# Patient Record
Sex: MALE | Race: WHITE | NOT HISPANIC OR LATINO | Employment: FULL TIME | URBAN - METROPOLITAN AREA
[De-identification: names, ages, dates, MRNs, and addresses within clinical notes are randomized per-mention and may not be internally consistent; named-entity substitution may affect disease eponyms.]

---

## 2017-01-30 ENCOUNTER — GENERIC CONVERSION - ENCOUNTER (OUTPATIENT)
Dept: OTHER | Facility: OTHER | Age: 62
End: 2017-01-30

## 2017-02-20 ENCOUNTER — ALLSCRIPTS OFFICE VISIT (OUTPATIENT)
Dept: OTHER | Facility: OTHER | Age: 62
End: 2017-02-20

## 2017-08-21 ENCOUNTER — ALLSCRIPTS OFFICE VISIT (OUTPATIENT)
Dept: OTHER | Facility: OTHER | Age: 62
End: 2017-08-21

## 2017-08-21 DIAGNOSIS — Z12.11 ENCOUNTER FOR SCREENING FOR MALIGNANT NEOPLASM OF COLON: ICD-10-CM

## 2017-09-11 ENCOUNTER — GENERIC CONVERSION - ENCOUNTER (OUTPATIENT)
Dept: OTHER | Facility: OTHER | Age: 62
End: 2017-09-11

## 2017-12-04 ENCOUNTER — ALLSCRIPTS OFFICE VISIT (OUTPATIENT)
Dept: OTHER | Facility: OTHER | Age: 62
End: 2017-12-04

## 2018-01-10 NOTE — PROGRESS NOTES
Assessment    1  Encounter for preventive health examination (V70 0) (Z00 00)   2  Fatigue (780 79) (R53 83)    Plan  Health Maintenance    · We recommend that you follow the "Mediterranean diet "; Status:Complete;   Done:  [id number] 09:51AM    Check CBC, PSA, CMP, Fasting lipids  Start Effexor 75 mg PO qd, titrate as appropriate  Decrease Wellbutrin slowly  Form/documents completed and signed  F/U 2 weeks     Discussion/Summary  health maintenance visit     Stable medically  We will check fasting b/w today, FOBT given to the patient w/ instructions  We also discussed the increased stressors impacting the pt in his psychosocial situation  He has reconciled w/ his sister and acquired a couple of new cats for home  His work situation might improve w/ the hiring of another  but thept is careful not to gets his hopes up too far  He has not picked up alcohol nor drugs and is in contact w/ his sponsor  I encourage him to share more at the meetings but only to his comfort level  We will adjust his medications w/ a decrease in the Wellbutrin and introducing Effexor  Follow in ~2 weeks  The patient was counseled regarding instructions for management, impressions  total time of encounter was 30 minutes and 20 minutes was spent counseling  History of Present Illness  HM, Adult Male: The patient is being seen for a health maintenance evaluation  The last health maintenance visit was 6 month(s) ago  General Health: The patient's health since the last visit is described as fair  He denies vision problems  He denies hearing loss  Lifestyle:  He has weight concerns  He does not exercise regularly  He denies alcohol use  He denies drug use  Screening: Prostate cancer screening includes prostate-specific antigen testing last year  Testicular cancer screening includes no previous evaluation  Colorectal cancer screening includes last colonoscopy done `10 yrs  HPI: 64 y/o WM for employment biometric PE   No acute c/o  He does note, however, that his mood has decreased and he is more irritable than at last visit He reports having had a rough start of the year w/ the death of his cats at the hands of his sister who did not take care of them as promised when the pt went on a trip This and work stressors have made it difficult not to '' and break his sobriety  He has reached out to his sponsor and continues to attend meetings  Review of Systems    Constitutional: No fever or chills, feels well, no tiredness, no recent weight gain or weight loss  Eyes: No complaints of eye pain, no red eyes, no discharge from eyes, no itchy eyes  ENT: no complaints of earache, no hearing loss, no nosebleeds, no nasal discharge, no sore throat, no hoarseness  Cardiovascular: Has not followed w/ Cardiology, but No complaints of slow heart rate, no fast heart rate, no chest pain, no palpitations, no leg claudication, no lower extremity and as noted in HPI  Respiratory: No complaints of shortness of breath, no wheezing, no cough, no SOB on exertion, no orthopnea or PND  Gastrointestinal: No complaints of abdominal pain, no constipation, no nausea or vomiting, no diarrhea or bloody stools  Musculoskeletal: No complaints of arthralgia, no myalgias, no joint swelling or stiffness, no limb pain or swelling  Integumentary: No complaints of skin rash or skin lesions, no itching, no skin wound, no dry skin  Neurological: No compliants of headache, no confusion, no convulsions, no numbness or tingling, no dizziness or fainting, no limb weakness, no difficulty walking  Psychiatric: as noted in HPI  Endocrine: No complaints of proptosis, no hot flashes, no muscle weakness, no erectile dysfunction, no deepening of the voice, no feelings of weakness  Hematologic/Lymphatic: No complaints of swollen glands, no swollen glands in the neck, does not bleed easily, no easy bruising  ROS reviewed  Active Problems    1  Atherosclerosis of other coronary artery bypass graft (414 04) (I25 810)   2  Generalized anxiety disorder (300 02) (F41 1)   3  Hypertension, benign (401 1) (I10)   4  Mixed hyperlipidemia (272 2) (E78 2)    Past Medical History    · History of Obesity (BMI 30 0-34 9) (278 00) (E66 9)   · History of Recovering alcoholic (988 50) (X78 67)    Surgical History    · History of Appendectomy   · History of CABG   · History of Cath Stent Placement   · History of Cholecystotomy   · History of Inguinal Hernia Repair   · History of Tonsillectomy    Family History  Mother    · Family history of COPD, severe  Father    · Family history of CAD (coronary artery disease)   · Family history of COPD, severe  Maternal Aunt    · Family history of diabetes mellitus (V18 0) (Z83 3)  Maternal Uncle    · Family history of diabetes mellitus (V18 0) (Z83 3)    Social History    · Denies alcohol consumption (V49 89) (Z78 9)   · Former smoker (V15 82) (E97 275)   · Full-time employment   · History of tattoo (709 09) (L81 8)   · Lives alone with help available (V60 3) (Z60 2)    Current Meds   1  BuPROPion HCl ER (XL) 300 MG Oral Tablet Extended Release 24 Hour; Therapy: (Recorded:30Jan2017) to Recorded   2  Metoprolol Tartrate 25 MG Oral Tablet; Therapy: (Recorded:30Jan2017) to Recorded   3  Nitroglycerin 0 4 MG Sublingual Tablet Sublingual;   Therapy: (Recorded:30Jan2017) to Recorded   4  Simvastatin 20 MG Oral Tablet; Therapy: (Recorded:30Jan2017) to Recorded    Allergies    1  No Known Drug Allergies    Vitals   Recorded: [id number] 58:57KV   Systolic 063, RUE, Sitting   Diastolic 62, RUE, Sitting   Height 5 ft 11 in   Weight 261 lb    BMI Calculated 36 4   BSA Calculated 2 36     Physical Exam    Constitutional   General appearance: No acute distress, well appearing and well nourished  Eyes   Pupils and irises: Equal, round and reactive to light      Pulmonary   Respiratory effort: No increased work of breathing or signs of respiratory distress  Auscultation of lungs: Clear to auscultation  Cardiovascular   Auscultation of heart: Normal rate and rhythm, normal S1 and S2, without murmurs  Examination of extremities for edema and/or varicosities: Normal     Abdomen   Abdomen: Abnormal   The abdomen was obese  Lymphatic   Palpation of lymph nodes in neck: No lymphadenopathy  Musculoskeletal   Gait and station: Normal     Digits and nails: Normal without clubbing or cyanosis  Inspection/palpation of joints, bones, and muscles: Normal     Skin   Skin and subcutaneous tissue: Normal without rashes or lesions  Neurologic   Cranial nerves: Cranial nerves 2-12 intact  Sensation: No sensory loss  Psychiatric   Orientation to person, place and time: Normal     Mood and affect: Normal   Mood and Affect: appropriate mood, appropriate affect, not angry and not indifferent  Results/Data  PHQ-9 Adult Depression Screening [id number] 08:43AM User, [facility]     Test Name Result Flag Reference   PHQ-9 Adult Depression Score 14     Over the last two weeks, how often have you been bothered by any of the following problems? Little interest or pleasure in doing things: More than half the days - 2  Feeling down, depressed, or hopeless: More than half the days - 2  Trouble falling or staying asleep, or sleeping too much: More than half the days - 2  Feeling tired or having little energy: Several days - 1  Poor appetite or over eating: More than half the days - 2  Feeling bad about yourself - or that you are a failure or have let yourself or your family down: Nearly every day - 3  Trouble concentrating on things, such as reading the newspaper or watching television: Several days - 1  Moving or speaking so slowly that other people could have noticed   Or the opposite -  being so fidgety or restless that you have been moving around a lot more than usual: Several days - 1  Thoughts that you would be better off dead, or of hurting yourself in some way: Not at all - 0   PHQ-9 Adult Depression Screening Positive     PHQ-9 Difficulty Level Very difficult     PHQ-9 Severity Moderate Depression         Signatures   Electronically signed by : KASSIDY Mckeon ; Aug 21 2017  9:58AM EST                       (Author)

## 2018-01-13 VITALS
WEIGHT: 248 LBS | BODY MASS INDEX: 34.72 KG/M2 | SYSTOLIC BLOOD PRESSURE: 120 MMHG | DIASTOLIC BLOOD PRESSURE: 62 MMHG | HEIGHT: 71 IN

## 2018-01-13 VITALS
BODY MASS INDEX: 36.54 KG/M2 | HEIGHT: 71 IN | DIASTOLIC BLOOD PRESSURE: 62 MMHG | WEIGHT: 261 LBS | SYSTOLIC BLOOD PRESSURE: 100 MMHG

## 2018-01-15 VITALS
BODY MASS INDEX: 35.84 KG/M2 | WEIGHT: 256 LBS | HEIGHT: 71 IN | DIASTOLIC BLOOD PRESSURE: 62 MMHG | SYSTOLIC BLOOD PRESSURE: 92 MMHG

## 2018-01-22 VITALS
HEIGHT: 71 IN | SYSTOLIC BLOOD PRESSURE: 110 MMHG | BODY MASS INDEX: 36.54 KG/M2 | DIASTOLIC BLOOD PRESSURE: 72 MMHG | WEIGHT: 261 LBS

## 2018-01-23 VITALS
WEIGHT: 266 LBS | SYSTOLIC BLOOD PRESSURE: 90 MMHG | DIASTOLIC BLOOD PRESSURE: 60 MMHG | HEIGHT: 71 IN | BODY MASS INDEX: 37.24 KG/M2

## 2018-01-25 DIAGNOSIS — J11.1 INFLUENZA: Primary | ICD-10-CM

## 2018-01-25 NOTE — TELEPHONE ENCOUNTER
Pt called stating flu sx started two days ago  Patient had vaccine, but based on co-morbidities, prescribe Tamiflu

## 2018-01-26 RX ORDER — OSELTAMIVIR PHOSPHATE 75 MG/1
75 CAPSULE ORAL EVERY 12 HOURS SCHEDULED
Qty: 10 CAPSULE | Refills: 0 | Status: SHIPPED | OUTPATIENT
Start: 2018-01-26 | End: 2018-01-31

## 2018-05-21 DIAGNOSIS — E78.5 HYPERLIPIDEMIA, UNSPECIFIED HYPERLIPIDEMIA TYPE: Primary | ICD-10-CM

## 2018-05-21 RX ORDER — SIMVASTATIN 20 MG
TABLET ORAL
COMMUNITY
End: 2018-05-21 | Stop reason: SDUPTHER

## 2018-05-21 RX ORDER — SIMVASTATIN 20 MG
20 TABLET ORAL
Qty: 90 TABLET | Refills: 1 | Status: SHIPPED | OUTPATIENT
Start: 2018-05-21 | End: 2018-11-17 | Stop reason: SDUPTHER

## 2018-05-29 RX ORDER — NITROGLYCERIN 0.4 MG/1
TABLET SUBLINGUAL
COMMUNITY
End: 2019-10-01 | Stop reason: ALTCHOICE

## 2018-05-29 RX ORDER — BUPROPION HYDROCHLORIDE 300 MG/1
1 TABLET ORAL DAILY
COMMUNITY
End: 2018-08-27 | Stop reason: SDUPTHER

## 2018-05-31 ENCOUNTER — OFFICE VISIT (OUTPATIENT)
Dept: FAMILY MEDICINE CLINIC | Facility: CLINIC | Age: 63
End: 2018-05-31
Payer: COMMERCIAL

## 2018-05-31 VITALS
HEART RATE: 85 BPM | WEIGHT: 273 LBS | BODY MASS INDEX: 38.22 KG/M2 | TEMPERATURE: 97.4 F | DIASTOLIC BLOOD PRESSURE: 70 MMHG | OXYGEN SATURATION: 96 % | HEIGHT: 71 IN | SYSTOLIC BLOOD PRESSURE: 110 MMHG

## 2018-05-31 DIAGNOSIS — I48.0 PAROXYSMAL ATRIAL FIBRILLATION (HCC): ICD-10-CM

## 2018-05-31 DIAGNOSIS — E78.2 MIXED HYPERLIPIDEMIA: ICD-10-CM

## 2018-05-31 DIAGNOSIS — F41.1 GENERALIZED ANXIETY DISORDER: ICD-10-CM

## 2018-05-31 DIAGNOSIS — I10 HYPERTENSION, BENIGN: ICD-10-CM

## 2018-05-31 DIAGNOSIS — I25.10 CORONARY ARTERY DISEASE INVOLVING NATIVE CORONARY ARTERY OF NATIVE HEART WITHOUT ANGINA PECTORIS: Primary | ICD-10-CM

## 2018-05-31 DIAGNOSIS — Z12.11 SCREENING FOR COLON CANCER: ICD-10-CM

## 2018-05-31 PROBLEM — I25.810 ATHEROSCLEROSIS OF CORONARY ARTERY BYPASS GRAFT: Status: ACTIVE | Noted: 2017-01-30

## 2018-05-31 PROBLEM — I25.810 ATHEROSCLEROSIS OF CORONARY ARTERY BYPASS GRAFT: Status: RESOLVED | Noted: 2017-01-30 | Resolved: 2018-05-31

## 2018-05-31 LAB
ALBUMIN SERPL BCP-MCNC: 4 G/DL (ref 3.5–5)
ALP SERPL-CCNC: 87 U/L (ref 46–116)
ALT SERPL W P-5'-P-CCNC: 22 U/L (ref 12–78)
ANION GAP SERPL CALCULATED.3IONS-SCNC: 10 MMOL/L (ref 4–13)
AST SERPL W P-5'-P-CCNC: 17 U/L (ref 5–45)
BILIRUB SERPL-MCNC: 0.71 MG/DL (ref 0.2–1)
BUN SERPL-MCNC: 20 MG/DL (ref 5–25)
CALCIUM SERPL-MCNC: 9 MG/DL (ref 8.3–10.1)
CHLORIDE SERPL-SCNC: 108 MMOL/L (ref 100–108)
CHOLEST SERPL-MCNC: 138 MG/DL (ref 50–200)
CO2 SERPL-SCNC: 22 MMOL/L (ref 21–32)
CREAT SERPL-MCNC: 1.34 MG/DL (ref 0.6–1.3)
GFR SERPL CREATININE-BSD FRML MDRD: 57 ML/MIN/1.73SQ M
GLUCOSE P FAST SERPL-MCNC: 87 MG/DL (ref 65–99)
HDLC SERPL-MCNC: 37 MG/DL (ref 40–60)
LDLC SERPL CALC-MCNC: 87 MG/DL (ref 0–100)
POTASSIUM SERPL-SCNC: 4.1 MMOL/L (ref 3.5–5.3)
PROT SERPL-MCNC: 7.5 G/DL (ref 6.4–8.2)
SODIUM SERPL-SCNC: 140 MMOL/L (ref 136–145)
TRIGL SERPL-MCNC: 71 MG/DL

## 2018-05-31 PROCEDURE — 80061 LIPID PANEL: CPT | Performed by: PHYSICIAN ASSISTANT

## 2018-05-31 PROCEDURE — 36415 COLL VENOUS BLD VENIPUNCTURE: CPT | Performed by: PHYSICIAN ASSISTANT

## 2018-05-31 PROCEDURE — 99396 PREV VISIT EST AGE 40-64: CPT | Performed by: PHYSICIAN ASSISTANT

## 2018-05-31 PROCEDURE — 80053 COMPREHEN METABOLIC PANEL: CPT | Performed by: PHYSICIAN ASSISTANT

## 2018-05-31 RX ORDER — ASPIRIN 81 MG/1
81 TABLET, CHEWABLE ORAL
COMMUNITY
Start: 2015-10-12

## 2018-05-31 NOTE — PROGRESS NOTES
Assessment/Plan:    CAD (coronary artery disease), native coronary artery  Status post CABG x2 with preserved LVEF  Asymptomatic  Saw Cardiology this past week who would like lipids and CMP drawn on him  Continue control of blood pressure and lipids w/ daily aspirin    Hypertension, benign  Well controlled at 110/70  Continue metoprolol tartrate    Generalized anxiety disorder  Well controlled on Wellbutrin  Continue management    Mixed hyperlipidemia  Has lipid panel was within normal limits  Continue simvastatin    Screening for colon cancer  Referral to GI       Diagnoses and all orders for this visit:    Coronary artery disease involving native coronary artery of native heart without angina pectoris  -     Comprehensive metabolic panel  -     Lipid Panel with Direct LDL reflex    Hypertension, benign    Paroxysmal atrial fibrillation (HCC)  -     Comprehensive metabolic panel    Mixed hyperlipidemia  -     Lipid Panel with Direct LDL reflex    Screening for colon cancer  -     Ambulatory referral to Gastroenterology; Future    Generalized anxiety disorder    Other orders  -     nitroglycerin (NITROSTAT) 0 4 mg SL tablet; Place under the tongue  -     metoprolol tartrate (LOPRESSOR) 25 mg tablet; Take 1 tablet by mouth daily  -     buPROPion (WELLBUTRIN XL) 300 mg 24 hr tablet; Take 1 tablet by mouth daily  -     aspirin 81 mg chewable tablet; Chew 81 mg          Subjective:      Patient ID: Lundy Bamberger is a 66 y o  male  65 y/o M presents for annual exam   Patient has a history of CAD  He has had CABG x2  Most recent echo was within normal limits  On beta-blocker therapy and aspirin with metoprolol tartrate 12 5 mg b i d  Recently saw Cardiology who did not   Reports he never uses nitroglycerin due to lack of symptoms including chest pain, shortness of breath, nausea  No leg swelling or orthopnea  Lipid panel in September was within normal limits    He has done well on simvastatin 20 mg and denies myalgias  Anxiety has been well controlled on Wellbutrin  No anhedonia, change in appetite, poor concentration, poor work performance or suicidal ideation  Occasionally has difficulty sleeping  Patient had a colonoscopy 13 years ago that was normal           The following portions of the patient's history were reviewed and updated as appropriate: allergies, current medications, past family history, past medical history, past social history, past surgical history and problem list     Review of Systems   Constitutional: Negative for diaphoresis, fatigue and fever  HENT: Negative for congestion, ear pain, hearing loss, postnasal drip, rhinorrhea and sore throat  Eyes: Negative for pain, redness and visual disturbance  Respiratory: Negative for cough, shortness of breath and wheezing  Cardiovascular: Negative for chest pain, palpitations and leg swelling  Gastrointestinal: Negative for anal bleeding, constipation, diarrhea, nausea and vomiting  Endocrine: Negative for polydipsia and polyuria  Genitourinary: Negative for dysuria, flank pain and hematuria  Musculoskeletal: Negative for arthralgias, back pain, joint swelling and myalgias  Skin: Negative for pallor, rash and wound  Neurological: Negative for dizziness, syncope, numbness and headaches  Hematological: Negative for adenopathy  Does not bruise/bleed easily  Psychiatric/Behavioral: Positive for sleep disturbance  Negative for decreased concentration, dysphoric mood and suicidal ideas  The patient is not nervous/anxious  Objective:      /70 (BP Location: Left arm, Patient Position: Sitting, Cuff Size: Standard)   Pulse 85   Temp (!) 97 4 °F (36 3 °C)   Ht 5' 11" (1 803 m)   Wt 124 kg (273 lb)   SpO2 96%   BMI 38 08 kg/m²          Physical Exam   Constitutional: He is oriented to person, place, and time  He appears well-developed and well-nourished  No distress     HENT:   Head: Normocephalic and atraumatic  Mouth/Throat: Oropharynx is clear and moist    Eyes: Conjunctivae and EOM are normal  Pupils are equal, round, and reactive to light  Right eye exhibits no discharge  Left eye exhibits no discharge  No scleral icterus  Neck: Normal range of motion  Neck supple  No thyromegaly present  Cardiovascular: Normal rate, regular rhythm and normal heart sounds  Exam reveals no gallop and no friction rub  No murmur heard  Pulmonary/Chest: Effort normal and breath sounds normal  No respiratory distress  He has no wheezes  He has no rales  Abdominal: Soft  He exhibits no distension and no mass  There is no tenderness  There is no rebound and no guarding  Musculoskeletal: Normal range of motion  He exhibits no edema  Lymphadenopathy:     He has no cervical adenopathy  Neurological: He is alert and oriented to person, place, and time  No cranial nerve deficit  Skin: Skin is warm and dry  No rash noted  He is not diaphoretic  No erythema  No pallor

## 2018-05-31 NOTE — ASSESSMENT & PLAN NOTE
Status post CABG x2 with preserved LVEF  Asymptomatic  Saw Cardiology this past week who would like lipids and CMP drawn on him    Continue control of blood pressure and lipids w/ daily aspirin

## 2018-06-01 ENCOUNTER — TELEPHONE (OUTPATIENT)
Dept: FAMILY MEDICINE CLINIC | Facility: CLINIC | Age: 63
End: 2018-06-01

## 2018-06-01 NOTE — TELEPHONE ENCOUNTER
----- Message from Shyla Avilez PA-C sent at 6/1/2018 10:40 AM EDT -----  Please tell patient lipid panel was normal  There is some decline in kidney function: he should try to increase water intake and limit used of ibuprofen and naproxen

## 2018-07-09 ENCOUNTER — TELEPHONE (OUTPATIENT)
Dept: FAMILY MEDICINE CLINIC | Facility: CLINIC | Age: 63
End: 2018-07-09

## 2018-07-09 NOTE — TELEPHONE ENCOUNTER
Pt called stating labs should have been billed as his annual physical   Review of note shows provider indicated this was patients annual physical exam and coded the visit as such, but used patient's diagnoses as billing codes  Lab orders resubmitted with Z00 01 code for insurance reprocessing

## 2018-08-27 DIAGNOSIS — F32.89 OTHER DEPRESSION: Primary | ICD-10-CM

## 2018-08-27 RX ORDER — BUPROPION HYDROCHLORIDE 300 MG/1
300 TABLET ORAL DAILY
Qty: 90 TABLET | Refills: 1 | Status: SHIPPED | OUTPATIENT
Start: 2018-08-27 | End: 2019-02-23 | Stop reason: SDUPTHER

## 2018-11-17 DIAGNOSIS — E78.5 HYPERLIPIDEMIA, UNSPECIFIED HYPERLIPIDEMIA TYPE: ICD-10-CM

## 2018-11-18 RX ORDER — SIMVASTATIN 20 MG
TABLET ORAL
Qty: 90 TABLET | Refills: 1 | Status: SHIPPED | OUTPATIENT
Start: 2018-11-18 | End: 2019-10-01

## 2018-12-03 ENCOUNTER — OFFICE VISIT (OUTPATIENT)
Dept: FAMILY MEDICINE CLINIC | Facility: CLINIC | Age: 63
End: 2018-12-03
Payer: COMMERCIAL

## 2018-12-03 VITALS
SYSTOLIC BLOOD PRESSURE: 118 MMHG | BODY MASS INDEX: 37.8 KG/M2 | HEIGHT: 71 IN | OXYGEN SATURATION: 93 % | RESPIRATION RATE: 17 BRPM | WEIGHT: 270 LBS | TEMPERATURE: 97.5 F | DIASTOLIC BLOOD PRESSURE: 70 MMHG | HEART RATE: 84 BPM

## 2018-12-03 DIAGNOSIS — I25.10 CORONARY ARTERY DISEASE INVOLVING NATIVE CORONARY ARTERY OF NATIVE HEART WITHOUT ANGINA PECTORIS: Primary | ICD-10-CM

## 2018-12-03 DIAGNOSIS — Z23 ENCOUNTER FOR IMMUNIZATION: ICD-10-CM

## 2018-12-03 DIAGNOSIS — E78.2 MIXED HYPERLIPIDEMIA: ICD-10-CM

## 2018-12-03 DIAGNOSIS — I48.0 PAROXYSMAL ATRIAL FIBRILLATION (HCC): ICD-10-CM

## 2018-12-03 DIAGNOSIS — I10 HYPERTENSION, BENIGN: ICD-10-CM

## 2018-12-03 DIAGNOSIS — F33.0 MILD EPISODE OF RECURRENT MAJOR DEPRESSIVE DISORDER (HCC): ICD-10-CM

## 2018-12-03 PROCEDURE — 3074F SYST BP LT 130 MM HG: CPT | Performed by: PHYSICIAN ASSISTANT

## 2018-12-03 PROCEDURE — 3008F BODY MASS INDEX DOCD: CPT | Performed by: PHYSICIAN ASSISTANT

## 2018-12-03 PROCEDURE — 3078F DIAST BP <80 MM HG: CPT | Performed by: PHYSICIAN ASSISTANT

## 2018-12-03 PROCEDURE — 90686 IIV4 VACC NO PRSV 0.5 ML IM: CPT | Performed by: PHYSICIAN ASSISTANT

## 2018-12-03 PROCEDURE — 90471 IMMUNIZATION ADMIN: CPT | Performed by: PHYSICIAN ASSISTANT

## 2018-12-03 PROCEDURE — 99214 OFFICE O/P EST MOD 30 MIN: CPT | Performed by: PHYSICIAN ASSISTANT

## 2018-12-03 NOTE — ASSESSMENT & PLAN NOTE
Lipid panel in May was within normal limits  Continue simvastatin 20 mg daily     Will perform fasting labs at next appointment

## 2018-12-03 NOTE — ASSESSMENT & PLAN NOTE
No recurrence since after her surgery  Continue aspirin and Lopressor    ER if chest pain or palpitations

## 2018-12-03 NOTE — ASSESSMENT & PLAN NOTE
Was seen by cardiology last month who did not make any changes  Overall asymptomatic    Continue beta-blocker, aspirin, simvastatin

## 2018-12-03 NOTE — ASSESSMENT & PLAN NOTE
Well controlled on Wellbutrin 300 mg daily  Recommended avoiding naps during the day to help with insomnia   F/u 6 months

## 2018-12-03 NOTE — PROGRESS NOTES
Assessment/Plan:    CAD (coronary artery disease), native coronary artery  Was seen by cardiology last month who did not make any changes  Overall asymptomatic  Continue beta-blocker, aspirin, simvastatin    Hypertension, benign  Well controlled at 118/70  Continue Lopressor 12 5 mg b i d  Paroxysmal atrial fibrillation (HCC)  No recurrence since after her surgery  Continue aspirin and Lopressor  ER if chest pain or palpitations    Generalized anxiety disorder  Well controlled on Wellbutrin 300 mg daily  Recommended avoiding naps during the day to help with insomnia  F/u 6 months    Mixed hyperlipidemia  Lipid panel in May was within normal limits  Continue simvastatin 20 mg daily  Will perform fasting labs at next appointment    Encounter for immunization  Flu shot given today       Diagnoses and all orders for this visit:    Coronary artery disease involving native coronary artery of native heart without angina pectoris    Paroxysmal atrial fibrillation (HCC)    Hypertension, benign    Generalized anxiety disorder    Mixed hyperlipidemia    Encounter for immunization  -     SYRINGE/SINGLE-DOSE VIAL: influenza vaccine, [id number], quadrivalent, 0 5 mL, preservative-free (AFLURIA, FLUARIX, FLULAVAL, FLUZONE)    Other orders  -     metoprolol tartrate (LOPRESSOR) 25 mg tablet; TAKE ONE-HALF (1/2) TABLET TWICE A DAY          Subjective:      Patient ID: Amisha Arizmendi is a 66 y o  male  59-year-old male presents for follow-up of CAD, paroxysmal AFib, hyperlipidemia, hypertension, depression  CAD is managed by [facility]  Saw them last month and they did not make any changes in his management  Had 1 episode of chest tightness when he went to lift something very heavy but it resolved almost immediately with rest   Other than that no chest pain even when he is riding a bicycle pulling parts  No dyspnea on exertion, leg swelling, orthopnea    His lipid panel has been well controlled with simvastatin 20 mg daily, last checked in May  He denies myalgias  His blood pressure is very well controlled at 118/70 with Lopressor 12 5 mg b i d  Depression has been well controlled with Wellbutrin 300 mg daily  Reports it is stable  Has more good days than bad, overall happy with control  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  Has trouble staying asleep sometimes  He works 2nd shift and often takes a nap before work  Denies poor appetite or suicidal ideation  Has not had any episodes of paroxysmal AFib since after his heart surgery  The following portions of the patient's history were reviewed and updated as appropriate: allergies, current medications, past family history, past medical history, past social history, past surgical history and problem list     Review of Systems   Constitutional: Negative for diaphoresis, fatigue and fever  HENT: Negative for congestion, ear pain, hearing loss, postnasal drip, rhinorrhea and sore throat  Eyes: Negative for pain, redness and visual disturbance  Respiratory: Negative for cough, shortness of breath and wheezing  Cardiovascular: Positive for chest pain  Negative for palpitations and leg swelling  Gastrointestinal: Negative for anal bleeding, constipation, diarrhea, nausea and vomiting  Endocrine: Negative for polydipsia and polyuria  Genitourinary: Negative for dysuria, flank pain and hematuria  Musculoskeletal: Negative for arthralgias, back pain, joint swelling and myalgias  Skin: Negative for pallor, rash and wound  Neurological: Negative for dizziness, syncope, numbness and headaches  Hematological: Negative for adenopathy  Does not bruise/bleed easily  Psychiatric/Behavioral: Positive for dysphoric mood and sleep disturbance  Negative for suicidal ideas  The patient is not nervous/anxious            Objective:      /70 (BP Location: Left arm, Patient Position: Sitting, Cuff Size: Extra-Large)   Pulse 84   Temp 97 5 °F (36 4 °C) (Tympanic)   Resp 17   Ht 5' 11" (1 803 m)   Wt 122 kg (270 lb)   SpO2 93%   BMI 37 66 kg/m²          Physical Exam   Constitutional: He is oriented to person, place, and time  He appears well-developed and well-nourished  No distress  HENT:   Head: Normocephalic and atraumatic  Mouth/Throat: Oropharynx is clear and moist    Eyes: Pupils are equal, round, and reactive to light  Conjunctivae and EOM are normal  Right eye exhibits no discharge  Left eye exhibits no discharge  No scleral icterus  Neck: Normal range of motion  Neck supple  No thyromegaly present  Cardiovascular: Normal rate, regular rhythm and normal heart sounds  Exam reveals no gallop and no friction rub  No murmur heard  Pulmonary/Chest: Effort normal and breath sounds normal  No respiratory distress  He has no wheezes  He has no rales  Musculoskeletal: Normal range of motion  He exhibits no edema  Lymphadenopathy:     He has no cervical adenopathy  Neurological: He is alert and oriented to person, place, and time  No cranial nerve deficit  Skin: Skin is warm and dry  No rash noted  He is not diaphoretic  No erythema  No pallor

## 2019-01-28 ENCOUNTER — OFFICE VISIT (OUTPATIENT)
Dept: FAMILY MEDICINE CLINIC | Facility: CLINIC | Age: 64
End: 2019-01-28
Payer: COMMERCIAL

## 2019-01-28 VITALS
WEIGHT: 260 LBS | TEMPERATURE: 97.3 F | HEIGHT: 71 IN | BODY MASS INDEX: 36.4 KG/M2 | HEART RATE: 76 BPM | OXYGEN SATURATION: 97 % | SYSTOLIC BLOOD PRESSURE: 118 MMHG | DIASTOLIC BLOOD PRESSURE: 70 MMHG

## 2019-01-28 DIAGNOSIS — D49.2 SKIN NEOPLASM: Primary | ICD-10-CM

## 2019-01-28 PROCEDURE — 3008F BODY MASS INDEX DOCD: CPT | Performed by: PHYSICIAN ASSISTANT

## 2019-01-28 PROCEDURE — 11102 TANGNTL BX SKIN SINGLE LES: CPT | Performed by: PHYSICIAN ASSISTANT

## 2019-01-28 PROCEDURE — 88305 TISSUE EXAM BY PATHOLOGIST: CPT | Performed by: PATHOLOGY

## 2019-01-28 PROCEDURE — 99213 OFFICE O/P EST LOW 20 MIN: CPT | Performed by: PHYSICIAN ASSISTANT

## 2019-01-28 RX ORDER — LIDOCAINE HYDROCHLORIDE AND EPINEPHRINE 10; 10 MG/ML; UG/ML
2 INJECTION, SOLUTION INFILTRATION; PERINEURAL ONCE
Status: COMPLETED | OUTPATIENT
Start: 2019-01-28 | End: 2019-01-28

## 2019-01-28 RX ADMIN — LIDOCAINE HYDROCHLORIDE AND EPINEPHRINE 2 ML: 10; 10 INJECTION, SOLUTION INFILTRATION; PERINEURAL at 12:19

## 2019-01-28 NOTE — ASSESSMENT & PLAN NOTE
Lesion on shoulder is concerning for SCC, deep shave biopsy performed today and will refer to dermatology- will fax biopsy report   Other lesions of concern are SKs and superficial veins

## 2019-01-28 NOTE — PROGRESS NOTES
Assessment/Plan:    Skin neoplasm  Lesion on shoulder is concerning for SCC, deep shave biopsy performed today and will refer to dermatology- will fax biopsy report  Other lesions of concern are SKs and superficial veins       Diagnoses and all orders for this visit:    Skin neoplasm  -     Ambulatory referral to Dermatology; Future  -     Tissue Exam  -     lidocaine-epinephrine (XYLOCAINE/EPINEPHRINE) 1 %-1:100,000 injection 2 mL; 2 mL by Infiltration route once   -     Biopsy          Subjective:      Patient ID: Mariely Wilson is a 66 y o  male  53-year-old male presents with concerns of several lesions on his back  The 1 he is most concerned about is on his left upper back, reports it looks like a scratch but has been present for over a year  He also has a growth on his lower back and a dark spot on his lower back as well  Denies pain, itching, bleeding  He has a history of significant sun burn and sun exposure  The following portions of the patient's history were reviewed and updated as appropriate: allergies, current medications, past family history, past medical history, past social history, past surgical history and problem list     Review of Systems   Constitutional: Negative for chills and fever  Skin: Positive for rash and wound  Objective:      /70   Pulse 76   Temp (!) 97 3 °F (36 3 °C)   Ht 5' 11" (1 803 m)   Wt 118 kg (260 lb)   SpO2 97%   BMI 36 26 kg/m²          Physical Exam   Constitutional: He appears well-developed and well-nourished  No distress  Skin: He is not diaphoretic            Biopsy  Date/Time: 1/28/2019 1:34 PM  Performed by: Janes Evans  Authorized by: Janes Evans     Procedure Details - Skin Biopsy:     Biopsy tissue type: skin    Biopsy method: saucerize biopsy      Body area:  Trunk    Trunk location:  Back    Initial size (mm):  15    Malignancy: malignancy unknown       2 mL 1% lidocaine with epinephrine was injected under the lesion for anaesthesia  Saucerization biopsy was performed with shave biopsy tool  Bleeding controlled with pressure and silver nitrate  Antibiotic ointment and dressing applied

## 2019-01-31 ENCOUNTER — TELEPHONE (OUTPATIENT)
Dept: FAMILY MEDICINE CLINIC | Facility: CLINIC | Age: 64
End: 2019-01-31

## 2019-02-04 ENCOUNTER — TELEPHONE (OUTPATIENT)
Dept: FAMILY MEDICINE CLINIC | Facility: CLINIC | Age: 64
End: 2019-02-04

## 2019-02-04 NOTE — TELEPHONE ENCOUNTER
Spoke with patient regarding tissue biopsy confirming basal cell carcinoma   He has an appointment with dermatology next Wednesday, biopsy report as well as picture of lesion was faxed to their office today

## 2019-02-23 DIAGNOSIS — F32.89 OTHER DEPRESSION: ICD-10-CM

## 2019-02-23 RX ORDER — BUPROPION HYDROCHLORIDE 300 MG/1
TABLET ORAL
Qty: 90 TABLET | Refills: 1 | Status: SHIPPED | OUTPATIENT
Start: 2019-02-23 | End: 2019-08-22 | Stop reason: SDUPTHER

## 2019-05-17 DIAGNOSIS — E78.5 HYPERLIPIDEMIA, UNSPECIFIED HYPERLIPIDEMIA TYPE: ICD-10-CM

## 2019-05-17 RX ORDER — SIMVASTATIN 20 MG
TABLET ORAL
Qty: 90 TABLET | Refills: 1 | OUTPATIENT
Start: 2019-05-17

## 2019-08-22 DIAGNOSIS — F32.89 OTHER DEPRESSION: ICD-10-CM

## 2019-08-28 ENCOUNTER — TELEPHONE (OUTPATIENT)
Dept: FAMILY MEDICINE CLINIC | Facility: CLINIC | Age: 64
End: 2019-08-28

## 2019-08-28 ENCOUNTER — APPOINTMENT (OUTPATIENT)
Dept: LAB | Facility: CLINIC | Age: 64
End: 2019-08-28
Payer: COMMERCIAL

## 2019-08-28 ENCOUNTER — OFFICE VISIT (OUTPATIENT)
Dept: FAMILY MEDICINE CLINIC | Facility: CLINIC | Age: 64
End: 2019-08-28
Payer: COMMERCIAL

## 2019-08-28 VITALS
DIASTOLIC BLOOD PRESSURE: 70 MMHG | HEIGHT: 71 IN | OXYGEN SATURATION: 97 % | WEIGHT: 229.8 LBS | BODY MASS INDEX: 32.17 KG/M2 | HEART RATE: 72 BPM | SYSTOLIC BLOOD PRESSURE: 100 MMHG | RESPIRATION RATE: 16 BRPM | TEMPERATURE: 96.8 F

## 2019-08-28 DIAGNOSIS — Z00.00 WELL ADULT EXAM: Primary | ICD-10-CM

## 2019-08-28 DIAGNOSIS — Z86.19 HISTORY OF CHICKENPOX: ICD-10-CM

## 2019-08-28 DIAGNOSIS — Z00.00 WELL ADULT EXAM: ICD-10-CM

## 2019-08-28 DIAGNOSIS — Z23 IMMUNIZATION DUE: ICD-10-CM

## 2019-08-28 DIAGNOSIS — N28.9 ABNORMAL RENAL FUNCTION: ICD-10-CM

## 2019-08-28 DIAGNOSIS — Z12.11 SCREEN FOR COLON CANCER: ICD-10-CM

## 2019-08-28 LAB
ALBUMIN SERPL BCP-MCNC: 4 G/DL (ref 3.5–5)
ALP SERPL-CCNC: 80 U/L (ref 46–116)
ALT SERPL W P-5'-P-CCNC: 25 U/L (ref 12–78)
ANION GAP SERPL CALCULATED.3IONS-SCNC: 6 MMOL/L (ref 4–13)
AST SERPL W P-5'-P-CCNC: 16 U/L (ref 5–45)
BASOPHILS # BLD AUTO: 0.06 THOUSANDS/ΜL (ref 0–0.1)
BASOPHILS NFR BLD AUTO: 1 % (ref 0–1)
BILIRUB SERPL-MCNC: 0.79 MG/DL (ref 0.2–1)
BILIRUB UR QL STRIP: NEGATIVE
BUN SERPL-MCNC: 19 MG/DL (ref 5–25)
CALCIUM SERPL-MCNC: 9.6 MG/DL (ref 8.3–10.1)
CHLORIDE SERPL-SCNC: 112 MMOL/L (ref 100–108)
CHOLEST SERPL-MCNC: 181 MG/DL (ref 50–200)
CLARITY UR: CLEAR
CO2 SERPL-SCNC: 27 MMOL/L (ref 21–32)
COLOR UR: YELLOW
CREAT SERPL-MCNC: 1.31 MG/DL (ref 0.6–1.3)
EOSINOPHIL # BLD AUTO: 0.16 THOUSAND/ΜL (ref 0–0.61)
EOSINOPHIL NFR BLD AUTO: 2 % (ref 0–6)
ERYTHROCYTE [DISTWIDTH] IN BLOOD BY AUTOMATED COUNT: 13.8 % (ref 11.6–15.1)
GFR SERPL CREATININE-BSD FRML MDRD: 58 ML/MIN/1.73SQ M
GLUCOSE P FAST SERPL-MCNC: 101 MG/DL (ref 65–99)
GLUCOSE UR STRIP-MCNC: NEGATIVE MG/DL
HCT VFR BLD AUTO: 49.6 % (ref 36.5–49.3)
HDLC SERPL-MCNC: 46 MG/DL (ref 40–60)
HGB BLD-MCNC: 16.6 G/DL (ref 12–17)
HGB UR QL STRIP.AUTO: NEGATIVE
IMM GRANULOCYTES # BLD AUTO: 0.05 THOUSAND/UL (ref 0–0.2)
IMM GRANULOCYTES NFR BLD AUTO: 1 % (ref 0–2)
KETONES UR STRIP-MCNC: NEGATIVE MG/DL
LDLC SERPL CALC-MCNC: 119 MG/DL (ref 0–100)
LEUKOCYTE ESTERASE UR QL STRIP: NEGATIVE
LYMPHOCYTES # BLD AUTO: 1.87 THOUSANDS/ΜL (ref 0.6–4.47)
LYMPHOCYTES NFR BLD AUTO: 26 % (ref 14–44)
MCH RBC QN AUTO: 32.6 PG (ref 26.8–34.3)
MCHC RBC AUTO-ENTMCNC: 33.5 G/DL (ref 31.4–37.4)
MCV RBC AUTO: 97 FL (ref 82–98)
MONOCYTES # BLD AUTO: 0.77 THOUSAND/ΜL (ref 0.17–1.22)
MONOCYTES NFR BLD AUTO: 11 % (ref 4–12)
NEUTROPHILS # BLD AUTO: 4.34 THOUSANDS/ΜL (ref 1.85–7.62)
NEUTS SEG NFR BLD AUTO: 59 % (ref 43–75)
NITRITE UR QL STRIP: NEGATIVE
NRBC BLD AUTO-RTO: 0 /100 WBCS
PH UR STRIP.AUTO: 5.5 [PH]
PLATELET # BLD AUTO: 164 THOUSANDS/UL (ref 149–390)
PMV BLD AUTO: 9.9 FL (ref 8.9–12.7)
POTASSIUM SERPL-SCNC: 4.8 MMOL/L (ref 3.5–5.3)
PROT SERPL-MCNC: 7.3 G/DL (ref 6.4–8.2)
PROT UR STRIP-MCNC: NEGATIVE MG/DL
PSA SERPL-MCNC: 0.6 NG/ML (ref 0–4)
RBC # BLD AUTO: 5.09 MILLION/UL (ref 3.88–5.62)
SODIUM SERPL-SCNC: 145 MMOL/L (ref 136–145)
SP GR UR STRIP.AUTO: 1.02 (ref 1–1.03)
TRIGL SERPL-MCNC: 78 MG/DL
UROBILINOGEN UR QL STRIP.AUTO: 1 E.U./DL
WBC # BLD AUTO: 7.25 THOUSAND/UL (ref 4.31–10.16)

## 2019-08-28 PROCEDURE — G0103 PSA SCREENING: HCPCS

## 2019-08-28 PROCEDURE — 36415 COLL VENOUS BLD VENIPUNCTURE: CPT

## 2019-08-28 PROCEDURE — 99396 PREV VISIT EST AGE 40-64: CPT | Performed by: FAMILY MEDICINE

## 2019-08-28 PROCEDURE — 81003 URINALYSIS AUTO W/O SCOPE: CPT | Performed by: FAMILY MEDICINE

## 2019-08-28 PROCEDURE — 80053 COMPREHEN METABOLIC PANEL: CPT

## 2019-08-28 PROCEDURE — 80061 LIPID PANEL: CPT

## 2019-08-28 PROCEDURE — 85025 COMPLETE CBC W/AUTO DIFF WBC: CPT

## 2019-08-28 NOTE — PROGRESS NOTES
Assessment/Plan:          Diagnoses and all orders for this visit:    Well adult exam  Comments:  Advised to see Ophthalmology   Orders:  -     Comprehensive metabolic panel; Future  -     Lipid Panel with Direct LDL reflex; Future  -     CBC and differential; Future  -     PSA, Total Screen; Future  -     UA w Reflex to Microscopic w Reflex to Culture    Abnormal renal function  Comments:   recheck renal function    Screen for colon cancer  Comments:  Recommend colonoscopy  Patient declined  Orders:  -     Ambulatory referral to Gastroenterology; Future    Immunization due  Comments:  Flu shot, Pneumovax  Orders:  -     PNEUMOCOCCAL CONJUGATE VACCINE 13-VALENT GREATER THAN 6 MONTHS  -     TDAP VACCINE GREATER THAN OR EQUAL TO 6YO IM    History of chickenpox  Comments:  Recommend shingles vaccine    Other orders  -     Cancel: CT colonoscopy diagnostic w contrast; Future            Subjective:     Patient ID: Nika Rollins is a 60 y o  male      Patient is here for wellness exam for his work  Patient works with the electronic computer system for a company  He feels well he has no complaint  Denied anxiety, depression  Denied smoking, drinking alcohol or abusing drugs  Diet  Healthy diet he change his diet in the last year and he did admit to significant weight loss  he stop it drinking soda, he cut down on his sugar and carb intake a lot  He is very active at work  Patient with known coronary artery disease/AFib  Denied chest pain, shortness of breath or palpitation he does follow with Cardiology  Patient with history of depression he is not depressed or anxious, denied suicide or homicide  Eye care  He does wear glasses but he did not see Ophthalmology for long time  He has dentures   he does not follow with the dentist   Melody Peter to history of chickenpox in childhood      Test results   labs done May last year   discussed result with patient      Review of Systems   Constitutional: Negative for activity change, appetite change, chills, diaphoresis, fatigue, fever and unexpected weight change  HENT: Negative for congestion, drooling, ear discharge, ear pain, facial swelling, hearing loss, nosebleeds, postnasal drip, rhinorrhea, sinus pressure, sinus pain, sneezing, sore throat, tinnitus, trouble swallowing and voice change  Eyes: Negative for photophobia, pain, redness, itching and visual disturbance  Respiratory: Negative for apnea, cough, chest tightness, shortness of breath, wheezing and stridor  Cardiovascular: Negative for chest pain, palpitations and leg swelling  Gastrointestinal: Negative for abdominal distention, abdominal pain, anal bleeding, blood in stool, constipation, diarrhea, nausea and vomiting  Endocrine: Negative for cold intolerance, heat intolerance, polydipsia and polyuria  Genitourinary: Negative for discharge, dysuria, frequency, hematuria, penile swelling, scrotal swelling, testicular pain and urgency  Musculoskeletal: Negative for arthralgias, back pain, gait problem, joint swelling, myalgias and neck pain  Skin: Negative for pallor and rash  Allergic/Immunologic: Negative for immunocompromised state  Neurological: Negative for dizziness, tremors, seizures, syncope, facial asymmetry, speech difficulty, weakness, light-headedness, numbness and headaches  Hematological: Negative for adenopathy  Does not bruise/bleed easily  Psychiatric/Behavioral: Negative for agitation, behavioral problems, confusion, decreased concentration, dysphoric mood, hallucinations, sleep disturbance and suicidal ideas  The patient is not nervous/anxious and is not hyperactive  Objective:     Physical Exam   Constitutional: He is oriented to person, place, and time  He appears well-developed and well-nourished  No distress  HENT:   Head: Normocephalic  Mouth/Throat: Oropharynx is clear and moist  No oropharyngeal exudate     Tympanic membrane not visualized there is excess wax bilaterally   nose positive septal deviation  Mucosa is normal   Eyes: Pupils are equal, round, and reactive to light  Conjunctivae and EOM are normal  Right eye exhibits no discharge  Left eye exhibits no discharge  No scleral icterus  Neck: Normal range of motion  No JVD present  No tracheal deviation present  No thyromegaly present  Cardiovascular: Normal rate, regular rhythm and normal heart sounds  Exam reveals no gallop and no friction rub  No murmur heard  Pulses:       Carotid pulses are 3+ on the right side, and 3+ on the left side  Dorsalis pedis pulses are 0 on the right side, and 0 on the left side  Posterior tibial pulses are 2+ on the right side, and 2+ on the left side  Pulmonary/Chest: Effort normal and breath sounds normal  He has no wheezes  He has no rales  Abdominal: Soft  Bowel sounds are normal  He exhibits no distension and no mass  There is no tenderness  There is no rebound and no guarding  Genitourinary:   Genitourinary Comments: Pt declined   Musculoskeletal: Normal range of motion  He exhibits no edema or tenderness  Lymphadenopathy:     He has no cervical adenopathy  Neurological: He is alert and oriented to person, place, and time  He displays normal reflexes  No cranial nerve deficit or sensory deficit  He exhibits normal muscle tone  Coordination normal    Gait is normal   Negative Babinski  Negative Romberg  Skin: No rash noted  He is not diaphoretic  No erythema  Positive callus of both feet bilaterally and thick nails bilaterally   Psychiatric: He has a normal mood and affect  His behavior is normal  Judgment and thought content normal    BMI Counseling: Body mass index is 32 05 kg/m²  Discussed the patient's BMI with him  The BMI is above average  BMI counseling and education was provided to the patient  Nutrition recommendations include reducing portion sizes

## 2019-08-29 ENCOUNTER — TELEPHONE (OUTPATIENT)
Dept: FAMILY MEDICINE CLINIC | Facility: CLINIC | Age: 64
End: 2019-08-29

## 2019-08-29 NOTE — TELEPHONE ENCOUNTER
Renal function is abnormal but stable , elevated BS , Chloride and Hct  lipid ok except LDL slightly high, to follow with low fat diet  ua is normal , check renal us ,vit D,  PTH intact and phosphorus level  hga1c   Recheck Cbc , and BMP

## 2019-09-11 RX ORDER — BUPROPION HYDROCHLORIDE 300 MG/1
TABLET ORAL
Qty: 90 TABLET | Refills: 0 | Status: SHIPPED | OUTPATIENT
Start: 2019-09-11 | End: 2019-12-10 | Stop reason: SDUPTHER

## 2019-09-14 ENCOUNTER — TELEPHONE (OUTPATIENT)
Dept: FAMILY MEDICINE CLINIC | Facility: CLINIC | Age: 64
End: 2019-09-14

## 2019-09-15 NOTE — TELEPHONE ENCOUNTER
Labs okay except, LDL cholesterol is 119  Chloride is high  Blood sugar 101, renal function abnormal but stable  Slight increase hematocrit  Urine is negative  Check renal ultrasound  Recheck chloride, hematocrit, PTH,, intactphosphorus, vitamin-D 25   Hemoglobin A1c

## 2019-09-16 ENCOUNTER — TELEPHONE (OUTPATIENT)
Dept: FAMILY MEDICINE CLINIC | Facility: CLINIC | Age: 64
End: 2019-09-16

## 2019-09-16 DIAGNOSIS — E78.2 MIXED HYPERLIPIDEMIA: ICD-10-CM

## 2019-09-16 DIAGNOSIS — N28.9 ABNORMAL RENAL FUNCTION: Primary | ICD-10-CM

## 2019-09-16 DIAGNOSIS — F33.0 MILD EPISODE OF RECURRENT MAJOR DEPRESSIVE DISORDER (HCC): ICD-10-CM

## 2019-10-01 ENCOUNTER — APPOINTMENT (OUTPATIENT)
Dept: LAB | Facility: CLINIC | Age: 64
End: 2019-10-01
Payer: COMMERCIAL

## 2019-10-01 ENCOUNTER — OFFICE VISIT (OUTPATIENT)
Dept: FAMILY MEDICINE CLINIC | Facility: CLINIC | Age: 64
End: 2019-10-01
Payer: COMMERCIAL

## 2019-10-01 VITALS
HEART RATE: 70 BPM | DIASTOLIC BLOOD PRESSURE: 64 MMHG | WEIGHT: 237.6 LBS | OXYGEN SATURATION: 96 % | BODY MASS INDEX: 35.19 KG/M2 | TEMPERATURE: 96.8 F | SYSTOLIC BLOOD PRESSURE: 110 MMHG | HEIGHT: 69 IN

## 2019-10-01 DIAGNOSIS — R73.9 ELEVATED BLOOD SUGAR: ICD-10-CM

## 2019-10-01 DIAGNOSIS — E78.00 PURE HYPERCHOLESTEROLEMIA: ICD-10-CM

## 2019-10-01 DIAGNOSIS — Z12.11 SCREENING FOR COLON CANCER: ICD-10-CM

## 2019-10-01 DIAGNOSIS — Z53.20 COLONOSCOPY REFUSED: ICD-10-CM

## 2019-10-01 DIAGNOSIS — R74.8 LOW SERUM HDL: ICD-10-CM

## 2019-10-01 DIAGNOSIS — R71.8 ABNORMAL HEMATOCRIT: ICD-10-CM

## 2019-10-01 DIAGNOSIS — Z23 IMMUNIZATION DUE: ICD-10-CM

## 2019-10-01 DIAGNOSIS — N18.9 CHRONIC RENAL FAILURE, UNSPECIFIED CKD STAGE: Primary | ICD-10-CM

## 2019-10-01 DIAGNOSIS — R63.5 WEIGHT GAIN: ICD-10-CM

## 2019-10-01 DIAGNOSIS — I25.10 CORONARY ARTERY DISEASE INVOLVING NATIVE CORONARY ARTERY OF NATIVE HEART WITHOUT ANGINA PECTORIS: ICD-10-CM

## 2019-10-01 LAB — TSH SERPL DL<=0.05 MIU/L-ACNC: 1.99 UIU/ML (ref 0.36–3.74)

## 2019-10-01 PROCEDURE — 84443 ASSAY THYROID STIM HORMONE: CPT

## 2019-10-01 PROCEDURE — 3008F BODY MASS INDEX DOCD: CPT | Performed by: FAMILY MEDICINE

## 2019-10-01 PROCEDURE — 99214 OFFICE O/P EST MOD 30 MIN: CPT | Performed by: FAMILY MEDICINE

## 2019-10-01 PROCEDURE — 36415 COLL VENOUS BLD VENIPUNCTURE: CPT

## 2019-10-01 RX ORDER — SIMVASTATIN 20 MG
TABLET ORAL
Qty: 135 TABLET | Refills: 1 | Status: SHIPPED | OUTPATIENT
Start: 2019-10-01 | End: 2019-11-12 | Stop reason: SDUPTHER

## 2019-10-01 RX ORDER — SIMVASTATIN 20 MG
20 TABLET ORAL
Qty: 90 TABLET | Refills: 1 | Status: CANCELLED | OUTPATIENT
Start: 2019-10-01

## 2019-10-01 NOTE — PROGRESS NOTES
Assessment/Plan:          Diagnoses and all orders for this visit:    Chronic renal failure, unspecified CKD stage  Comments:  Avoid NSAID and all toxic material to the kidney discussed referral to Nephrology patient would like to wait until next office visit  Hydrate self well    Pure hypercholesterolemia  Comments:  LDL not controlled will increase Zocor to 1 and half tablet recheck liver function and lipid in 3 months, advised to see Ophthalmology for routine eye care  Orders:  -     TSH, 3rd generation with Free T4 reflex; Future  -     simvastatin (ZOCOR) 20 mg tablet; Take 1 and half tablet daily,po    Low serum HDL  Comments:  Corrected, advised to walk 20 minutes daily    Coronary artery disease involving native coronary artery of native heart without angina pectoris  Comments:  Patient is asymptomatic he does follow with Cardiology Dr Sagar Liao    Abnormal hematocrit  Comments:  Check CBC  Order exist    Weight gain  Comments:  Advised to watch his diet  Orders:  -     TSH, 3rd generation with Free T4 reflex; Future    Elevated blood sugar  Comments:  Check hemoglobin A1c  Order exist    Immunization due  Comments:  Pneumovax and Tdap  To consider given pneumovax with next office visit and also to check if Tdap it is a covered service    Screening for colon cancer  -     Cologuard; Future    Colonoscopy refused    Other orders  -     Cancel: simvastatin (ZOCOR) 20 mg tablet; Take 1 tablet (20 mg total) by mouth daily at bedtime            Subjective:     Patient ID: Damaso Larose is a 60 y o  male      Patient is here for follow-up on his chronic medical problem  Abnormal renal function test   Denied problem with urination  Denied edema  Depression  Doing well  Denied depression, anxiety  Suicide or homicide  Coronary artery disease  Denied chest pain or shortness of breath  Hyperlipidemia admit to regular fat intake denied side effect with Zocor    Denied chest pain   colon cancer screening ,Patient requesting another form for colon cancer screening  He does not want to have colonoscopy  Weight gain  patient had gain weight recently because he was not watching his diet  No change in diet  Denied cold intolerance  Or fatigue    Had a flu shot on 9/17 2019 at work    Test results  Lab done August 28, 2019   discussed result with patient      Review of Systems   Constitutional: Negative for appetite change and fatigue  HENT: Negative for ear pain, tinnitus, trouble swallowing and voice change  Eyes: Negative for photophobia, pain and visual disturbance  Respiratory: Negative for cough, chest tightness and wheezing  Cardiovascular: Negative for chest pain, palpitations and leg swelling  Gastrointestinal: Negative for abdominal distention, abdominal pain, anal bleeding, blood in stool, constipation, diarrhea, nausea and rectal pain  Endocrine: Negative for cold intolerance, heat intolerance, polydipsia and polyuria  Genitourinary: Negative for decreased urine volume, difficulty urinating, dysuria, flank pain, frequency, hematuria, penile swelling and urgency  Musculoskeletal: Negative for arthralgias, back pain, gait problem, myalgias and neck pain  Skin: Negative for pallor and rash  Allergic/Immunologic: Negative for immunocompromised state  Neurological: Negative for dizziness, seizures, syncope and speech difficulty  Hematological: Negative for adenopathy  Does not bruise/bleed easily  Psychiatric/Behavioral: Negative for agitation, confusion and hallucinations  The patient is not nervous/anxious  Objective:     Physical Exam   Constitutional: He is oriented to person, place, and time  He appears well-developed and well-nourished  No distress  HENT:   Head: Normocephalic  Mouth/Throat: Oropharynx is clear and moist  No oropharyngeal exudate  Eyes: Pupils are equal, round, and reactive to light  EOM are normal  No scleral icterus  Neck: Normal range of motion   Neck supple  No JVD present  No tracheal deviation present  Cardiovascular: Normal rate, regular rhythm and normal heart sounds  Exam reveals no gallop and no friction rub  No murmur heard  Pulses:       Carotid pulses are 3+ on the right side, and 3+ on the left side  Legs , no edema    Pulmonary/Chest: Effort normal and breath sounds normal    Abdominal: Soft  Bowel sounds are normal  He exhibits no distension and no mass  There is no tenderness  There is no rebound and no guarding  Musculoskeletal: Normal range of motion  He exhibits no edema or tenderness  Lymphadenopathy:     He has no cervical adenopathy  Neurological: He is alert and oriented to person, place, and time  No cranial nerve deficit  He exhibits normal muscle tone  Coordination normal    Normal gait   Skin: No rash noted  Psychiatric: He has a normal mood and affect   His behavior is normal

## 2019-10-04 PROBLEM — Z53.20 COLONOSCOPY REFUSED: Status: ACTIVE | Noted: 2019-10-04

## 2019-10-07 ENCOUNTER — HOSPITAL ENCOUNTER (OUTPATIENT)
Dept: ULTRASOUND IMAGING | Facility: MEDICAL CENTER | Age: 64
Discharge: HOME | End: 2019-10-07
Payer: COMMERCIAL

## 2019-10-07 DIAGNOSIS — N28.9 ABNORMAL RENAL FUNCTION: ICD-10-CM

## 2019-10-07 PROCEDURE — 76770 US EXAM ABDO BACK WALL COMP: CPT

## 2019-11-12 ENCOUNTER — OFFICE VISIT (OUTPATIENT)
Dept: FAMILY MEDICINE CLINIC | Facility: CLINIC | Age: 64
End: 2019-11-12
Payer: COMMERCIAL

## 2019-11-12 VITALS
RESPIRATION RATE: 16 BRPM | HEART RATE: 82 BPM | WEIGHT: 235.4 LBS | BODY MASS INDEX: 34.87 KG/M2 | DIASTOLIC BLOOD PRESSURE: 68 MMHG | SYSTOLIC BLOOD PRESSURE: 102 MMHG | OXYGEN SATURATION: 97 % | TEMPERATURE: 97 F | HEIGHT: 69 IN

## 2019-11-12 DIAGNOSIS — Z11.4 SCREENING FOR HIV (HUMAN IMMUNODEFICIENCY VIRUS): ICD-10-CM

## 2019-11-12 DIAGNOSIS — R73.9 ELEVATED BLOOD SUGAR: ICD-10-CM

## 2019-11-12 DIAGNOSIS — N18.9 CHRONIC RENAL FAILURE, UNSPECIFIED CKD STAGE: Primary | ICD-10-CM

## 2019-11-12 DIAGNOSIS — Z12.11 SCREENING FOR COLON CANCER: ICD-10-CM

## 2019-11-12 DIAGNOSIS — Z23 ENCOUNTER FOR IMMUNIZATION: ICD-10-CM

## 2019-11-12 DIAGNOSIS — E78.00 PURE HYPERCHOLESTEROLEMIA: ICD-10-CM

## 2019-11-12 DIAGNOSIS — I10 HYPERTENSION, BENIGN: ICD-10-CM

## 2019-11-12 DIAGNOSIS — E87.8 HIGH SERUM CHLORIDE: ICD-10-CM

## 2019-11-12 PROCEDURE — 90670 PCV13 VACCINE IM: CPT

## 2019-11-12 PROCEDURE — 99214 OFFICE O/P EST MOD 30 MIN: CPT | Performed by: FAMILY MEDICINE

## 2019-11-12 PROCEDURE — 90471 IMMUNIZATION ADMIN: CPT

## 2019-11-12 RX ORDER — SIMVASTATIN 10 MG
10 TABLET ORAL
Qty: 90 TABLET | Refills: 1 | Status: SHIPPED | OUTPATIENT
Start: 2019-11-12

## 2019-11-12 RX ORDER — SIMVASTATIN 20 MG
TABLET ORAL
Qty: 90 TABLET | Refills: 1 | Status: SHIPPED | OUTPATIENT
Start: 2019-11-12 | End: 2020-05-11

## 2019-11-12 NOTE — PROGRESS NOTES
Assessment/Plan:          Diagnoses and all orders for this visit:    Chronic renal failure, unspecified CKD stage  Comments:  Advised patient to increase fluid intake and to avoid NSAID  To consider referral to Nephrology after blood work done  Orders:  -     Phosphorus; Future  -     Basic metabolic panel; Future  -     PTH, intact; Future  -     Vitamin D 25 hydroxy; Future    Pure hypercholesterolemia  Comments: To follow with low-fat diet  Will check lipid profile function test with next office visit  Orders:  -     simvastatin (ZOCOR) 20 mg tablet; Take 1 tablet in addition to simvastatin 10mg tablet  -     simvastatin (ZOCOR) 10 mg tablet; Take 1 tablet (10 mg total) by mouth daily at bedtime Take 1 Tablet in addition to simvastatin 20mg  Elevated blood sugar  Comments:  Watch sweet and carbohydrate intake  Orders:  -     Basic metabolic panel; Future  -     Hemoglobin A1C; Future    Hypertension, benign  Comments:  Controlled  To follow with the dash diet  Patient advised to call if feels dizzy    High serum chloride  -     Basic metabolic panel; Future    Screening for colon cancer  Comments:  Patient refused colonoscopy advised to do check cologaurd ,order exist    Screening for HIV (human immunodeficiency virus)  Comments:  Verbal consent obtained  Consel patient  Orders:  -     HIV 1/2 AG-AB combo; Future    Encounter for immunization  Comments:  Recommend Tdap with next office visit  Orders:  -     PNEUMOCOCCAL CONJUGATE VACCINE 13-VALENT GREATER THAN 6 MONTHS            Subjective:     Patient ID: Cletus Gosselin is a 60 y o  male      Patient is here for follow-up  Chronic renal failure  Patient stated he does not drink enough fluid during the day  Hypertension  Denied headache or dizziness admit to regular salt intake  Elevated blood sugar, denied polyuria or polydipsia    Admit to regular sweet and carbohydrate intake  Hyperlipidemia, but has been taking 30 mg Zocor since last office visit, denied side effect with Zocor  Admit to low-fat diet  Denied chest pain    Test results   renal and bladder ultrasound  Labs only TSH done  Discussed result with patient  Rest of labs not done      Review of Systems   Constitutional: Negative for appetite change and fatigue  HENT: Negative for ear pain, tinnitus, trouble swallowing and voice change  Eyes: Negative for photophobia, pain and visual disturbance  Respiratory: Negative for cough, chest tightness and wheezing  Cardiovascular: Negative for chest pain, palpitations and leg swelling  Gastrointestinal: Negative for abdominal distention, abdominal pain, anal bleeding, constipation, diarrhea, nausea and rectal pain  Endocrine: Negative for cold intolerance, heat intolerance, polydipsia and polyuria  Genitourinary: Negative for decreased urine volume, difficulty urinating, dysuria, flank pain, frequency, hematuria and urgency  Musculoskeletal: Negative for arthralgias, back pain, gait problem, myalgias and neck pain  Skin: Negative for pallor and rash  Allergic/Immunologic: Negative for immunocompromised state  Neurological: Negative for dizziness, seizures, syncope and speech difficulty  Hematological: Negative for adenopathy  Does not bruise/bleed easily  Psychiatric/Behavioral: Negative for agitation, confusion and hallucinations  The patient is not nervous/anxious  Objective:     Physical Exam   Constitutional: He is oriented to person, place, and time  He appears well-developed and well-nourished  No distress  HENT:   Head: Normocephalic  Mouth/Throat: Oropharynx is clear and moist  No oropharyngeal exudate  Eyes: Pupils are equal, round, and reactive to light  EOM are normal  No scleral icterus  Neck: Normal range of motion  No JVD present  No tracheal deviation present  Cardiovascular: Normal rate and regular rhythm  Exam reveals no gallop and no friction rub  No murmur heard    Pulmonary/Chest: Effort normal and breath sounds normal    Abdominal: Soft  Bowel sounds are normal  He exhibits no distension and no mass  There is no tenderness  There is no rebound and no guarding  Musculoskeletal: Normal range of motion  He exhibits no edema or tenderness  Lymphadenopathy:     He has no cervical adenopathy  Neurological: He is alert and oriented to person, place, and time  No cranial nerve deficit  He exhibits normal muscle tone  Coordination normal    Skin: No rash noted  Psychiatric: He has a normal mood and affect   His behavior is normal  Judgment and thought content normal

## 2019-11-15 PROBLEM — E87.8 HIGH SERUM CHLORIDE: Status: ACTIVE | Noted: 2019-11-15

## 2019-11-20 ENCOUNTER — APPOINTMENT (OUTPATIENT)
Dept: LAB | Facility: CLINIC | Age: 64
End: 2019-11-20
Payer: COMMERCIAL

## 2019-11-20 DIAGNOSIS — Z11.4 SCREENING FOR HIV (HUMAN IMMUNODEFICIENCY VIRUS): ICD-10-CM

## 2019-11-20 DIAGNOSIS — N18.9 CHRONIC RENAL FAILURE, UNSPECIFIED CKD STAGE: ICD-10-CM

## 2019-11-20 DIAGNOSIS — R73.9 ELEVATED BLOOD SUGAR: ICD-10-CM

## 2019-11-20 DIAGNOSIS — E87.8 HIGH SERUM CHLORIDE: ICD-10-CM

## 2019-11-20 LAB
25(OH)D3 SERPL-MCNC: 25.6 NG/ML (ref 30–100)
ANION GAP SERPL CALCULATED.3IONS-SCNC: 4 MMOL/L (ref 4–13)
BUN SERPL-MCNC: 30 MG/DL (ref 5–25)
CALCIUM SERPL-MCNC: 9.7 MG/DL (ref 8.3–10.1)
CHLORIDE SERPL-SCNC: 111 MMOL/L (ref 100–108)
CO2 SERPL-SCNC: 27 MMOL/L (ref 21–32)
CREAT SERPL-MCNC: 1.5 MG/DL (ref 0.6–1.3)
EST. AVERAGE GLUCOSE BLD GHB EST-MCNC: 108 MG/DL
GFR SERPL CREATININE-BSD FRML MDRD: 49 ML/MIN/1.73SQ M
GLUCOSE P FAST SERPL-MCNC: 88 MG/DL (ref 65–99)
HBA1C MFR BLD: 5.4 % (ref 4.2–6.3)
PHOSPHATE SERPL-MCNC: 3.4 MG/DL (ref 2.3–4.1)
POTASSIUM SERPL-SCNC: 4.7 MMOL/L (ref 3.5–5.3)
PTH-INTACT SERPL-MCNC: 34.4 PG/ML (ref 18.4–80.1)
SODIUM SERPL-SCNC: 142 MMOL/L (ref 136–145)

## 2019-11-20 PROCEDURE — 83970 ASSAY OF PARATHORMONE: CPT

## 2019-11-20 PROCEDURE — 82306 VITAMIN D 25 HYDROXY: CPT

## 2019-11-20 PROCEDURE — 83036 HEMOGLOBIN GLYCOSYLATED A1C: CPT

## 2019-11-20 PROCEDURE — 87389 HIV-1 AG W/HIV-1&-2 AB AG IA: CPT

## 2019-11-20 PROCEDURE — 80048 BASIC METABOLIC PNL TOTAL CA: CPT

## 2019-11-20 PROCEDURE — 36415 COLL VENOUS BLD VENIPUNCTURE: CPT

## 2019-11-20 PROCEDURE — 84100 ASSAY OF PHOSPHORUS: CPT

## 2019-11-21 LAB — HIV 1+2 AB+HIV1 P24 AG SERPL QL IA: NORMAL

## 2019-11-22 ENCOUNTER — TELEPHONE (OUTPATIENT)
Dept: FAMILY MEDICINE CLINIC | Facility: CLINIC | Age: 64
End: 2019-11-22

## 2019-11-22 DIAGNOSIS — N18.9 CHRONIC RENAL FAILURE, UNSPECIFIED CKD STAGE: Primary | ICD-10-CM

## 2019-11-22 NOTE — TELEPHONE ENCOUNTER
Patient aware of test results, he will start his OTC Vitamin D  Patient is aware, nephrology will be calling him for appointment and repeat blood work in one week

## 2019-11-22 NOTE — TELEPHONE ENCOUNTER
----- Message from Katie Davis MD sent at 11/20/2019  6:30 PM EST -----  Hemoglobin A1c is good, vitamin-D little low start vitamin-D 1000 unit over-the-counter once a day    Renal function slightly worse increase fluid intake recheck BMP in 1 week and refer patient to Nephrology for chronic renal failure

## 2019-12-10 DIAGNOSIS — F32.89 OTHER DEPRESSION: ICD-10-CM

## 2019-12-10 RX ORDER — BUPROPION HYDROCHLORIDE 300 MG/1
TABLET ORAL
Qty: 90 TABLET | Refills: 1 | Status: SHIPPED | OUTPATIENT
Start: 2019-12-10 | End: 2020-06-08

## 2020-02-03 ENCOUNTER — TELEPHONE (OUTPATIENT)
Dept: FAMILY MEDICINE CLINIC | Facility: CLINIC | Age: 65
End: 2020-02-03

## 2020-02-03 NOTE — TELEPHONE ENCOUNTER
I called patient to ask if he received the cologuard kit that it looks like it had been delivered to him and [employer] sent us a notice that patient has not process this specimen yet

## 2020-02-19 ENCOUNTER — TELEPHONE (OUTPATIENT)
Dept: FAMILY MEDICINE CLINIC | Facility: CLINIC | Age: 65
End: 2020-02-19

## 2020-03-25 ENCOUNTER — TELEPHONE (OUTPATIENT)
Dept: FAMILY MEDICINE CLINIC | Facility: CLINIC | Age: 65
End: 2020-03-25

## 2020-03-25 NOTE — TELEPHONE ENCOUNTER
Left message on patients voice mail to return phone call regarding his follow up appointment being due

## 2020-05-10 DIAGNOSIS — E78.00 PURE HYPERCHOLESTEROLEMIA: ICD-10-CM

## 2020-05-11 ENCOUNTER — TELEPHONE (OUTPATIENT)
Dept: FAMILY MEDICINE CLINIC | Facility: CLINIC | Age: 65
End: 2020-05-11

## 2020-05-11 RX ORDER — SIMVASTATIN 20 MG
TABLET ORAL
Qty: 90 TABLET | Refills: 0 | Status: SHIPPED | OUTPATIENT
Start: 2020-05-11

## 2020-06-07 DIAGNOSIS — F32.89 OTHER DEPRESSION: ICD-10-CM

## 2020-06-08 RX ORDER — BUPROPION HYDROCHLORIDE 300 MG/1
TABLET ORAL
Qty: 90 TABLET | Refills: 0 | Status: SHIPPED | OUTPATIENT
Start: 2020-06-08 | End: 2020-09-08

## 2020-09-05 DIAGNOSIS — F32.89 OTHER DEPRESSION: ICD-10-CM

## 2020-09-08 RX ORDER — BUPROPION HYDROCHLORIDE 300 MG/1
TABLET ORAL
Qty: 90 TABLET | Refills: 3 | Status: SHIPPED | OUTPATIENT
Start: 2020-09-08

## 2021-09-01 DIAGNOSIS — F32.89 OTHER DEPRESSION: ICD-10-CM

## 2021-11-03 RX ORDER — BUPROPION HYDROCHLORIDE 300 MG/1
TABLET ORAL
Qty: 90 TABLET | Refills: 3 | OUTPATIENT
Start: 2021-11-03

## 2023-12-06 NOTE — PROGRESS NOTES
Assessment    1  Hypertension, benign (401 1) (I10)  2  Mixed hyperlipidemia (272 2) (E78 2)  3  Atherosclerosis of other coronary artery bypass graft (414 04) (I25 810)  4  Fatigue (780 79) (R53 83)    Plan  Hypertension, benign    · Follow-up visit in 6 months Evaluation and Treatment  Follow-up  Status: Hold For -Scheduling  Requested for: [id number]   · Begin or continue regular aerobic exercise  Gradually work up to at least 3 sessions of30 minutes of exercise a week ; Status:Complete;   Done: [id number] 11:12AM   · Continue with our present treatment plan ; Status:Complete;   Done: [id number]   · We recommend that you follow the Mediterranean diet ; Status:Complete;   Done:[id number] 11:12AM  Watch Na+, regular exercise, medication as Rx'd Flu vaccine today   History of Present Illness  The patient states his depression has been stable since the last visit  Interval Symptoms: improved depression,-- improved depressed mood,-- denies loss of interest or pleasure in activities,-- denies insomnia,-- denies inability to perform normal activities,-- denies loss of energy,-- denies feelings of worthlessness,-- denies feelings of guilt,-- denies trouble concentrating-- and-- denies anxiety  Associated symptoms include: no thoughts of suicide  The patient is being seen for a routine clinic follow-up of coronary artery disease  The last clinic visit was 1 month(s) ago  Current treatment includes a low fat diet, sublingual nitroglycerin and beta blockers  By report, there is good compliance with treatment and good tolerance of treatment  Pertinent medical history:  prior myocardial infarction  Pertinent surgical history:  coronary bypass procedure  The patient is currently able to do activities of daily living without limitations and able to work without limitations  He was previously evaluated by a colleague and Cardiology  The patient is currently asymptomatic     64 y/o WM for f/u CAD, HTN, Hyperlipidemia and Depression  He is generally doing well, no acute c/o  He is UTD w/ Cardiology      Review of Systems   Constitutional: No fever or chills, feels well, no tiredness, no recent weight gain or weight loss  Eyes: No complaints of eye pain, no red eyes, no discharge from eyes, no itchy eyes  ENT: no complaints of earache, no hearing loss, no nosebleeds, no nasal discharge, no sore throat, no hoarseness  Cardiovascular: as noted in HPI  Respiratory: No complaints of shortness of breath, no wheezing, no cough, no SOB on exertion, no orthopnea or PND  Gastrointestinal: No complaints of abdominal pain, no constipation, no nausea or vomiting, no diarrhea or bloody stools  Musculoskeletal: No complaints of arthralgia, no myalgias, no joint swelling or stiffness, no limb pain or swelling  Integumentary: No complaints of skin rash or skin lesions, no itching, no skin wound, no dry skin  Neurological: No compliants of headache, no confusion, no convulsions, no numbness or tingling, no dizziness or fainting, no limb weakness, no difficulty walking  Psychiatric: as noted in HPI,-- not suicidal-- and-- no sleep disturbances  Endocrine: No complaints of proptosis, no hot flashes, no muscle weakness, no erectile dysfunction, no deepening of the voice, no feelings of weakness  Hematologic/Lymphatic: No complaints of swollen glands, no swollen glands in the neck, does not bleed easily, no easy bruising  ROS reviewed  Active Problems  1  Atherosclerosis of other coronary artery bypass graft (414 04) (I25 810)  2  Fatigue (780 79) (R53 83)  3  Generalized anxiety disorder (300 02) (F41 1)  4  Hypertension, benign (401 1) (I10)  5  Mixed hyperlipidemia (272 2) (E78 2)    Past Medical History  1  History of Obesity (BMI 30 0-34 9) (278 00) (E66 9)  2  History of Recovering alcoholic (901 70) (D84 75)    The active problems and past medical history were reviewed and updated today  Surgical History  1   History of Appendectomy  2  History of CABG  3  History of Cath Stent Placement  4  History of Cholecystotomy  5  History of Inguinal Hernia Repair  6  History of Tonsillectomy    The surgical history was reviewed and updated today  Family History  Mother   1  Family history of COPD, severe  Father   2  Family history of CAD (coronary artery disease)  3  Family history of COPD, severe  Maternal Aunt   4  Family history of diabetes mellitus (V18 0) (Z83 3)  Maternal Uncle   5  Family history of diabetes mellitus (V18 0) (Z83 3)    Social History     · Denies alcohol consumption (V49 89) (Z78 9)   · Former smoker (C27 03) (Z84 835)   · Full-time employment   · History of tattoo (709 09) (L81 8)   · Lives alone with help available (V60 3) (Z60 2)  The social history was reviewed and updated today  Current Meds  1  BuPROPion HCl ER (XL) 300 MG Oral Tablet Extended Release 24 Hour; Therapy: (Recorded:30Jan2017) to Recorded  2  Metoprolol Tartrate 25 MG Oral Tablet; Therapy: (Recorded:30Jan2017) to Recorded  3  Nitroglycerin 0 4 MG Sublingual Tablet Sublingual; Therapy: (Recorded:30Jan2017) to Recorded  4  Simvastatin 20 MG Oral Tablet; Therapy: (Recorded:30Jan2017) to Recorded    Allergies  1  No Known Drug Allergies    Vitals  Vital Signs    Recorded: 77DGL2010 11:58UP   Systolic 90, RUE, Sitting   Diastolic 60, RUE, Sitting   Height 5 ft 11 in   Weight 266 lb    BMI Calculated 37 1   BSA Calculated 2 38       Physical Exam   Constitutional  General appearance: No acute distress, well appearing and well nourished  Eyes  Pupils and irises: Equal, round and reactive to light  Pulmonary  Respiratory effort: No increased work of breathing or signs of respiratory distress  Auscultation of lungs: Clear to auscultation, equal breath sounds bilaterally, no wheezes, no rales, no rhonci  Cardiovascular  Auscultation of heart: Normal rate and rhythm, normal S1 and S2, without murmurs     Examination of extremities for edema and/or varicosities: Normal    Skin  Skin and subcutaneous tissue: Normal without rashes or lesions  Neurologic  Cranial nerves: Cranial nerves 2-12 intact  Psychiatric  Orientation to person, place and time: Normal    Mood and affect: Normal   Mood and Affect: appropriate mood,-- appropriate affect,-- not anhedonic,-- not anxious,-- calm,-- happy-- and-- not indifferent          Signatures   Electronically signed by : Dewaine Goodell, M D ; Dec  4 2017 11:12AM EST                       (Author)    Electronically signed by : Dewaine Goodell, M D ; Dec  4 2017 12:11PM EST                       (Author) No